# Patient Record
Sex: MALE | Race: OTHER | Employment: FULL TIME | URBAN - METROPOLITAN AREA
[De-identification: names, ages, dates, MRNs, and addresses within clinical notes are randomized per-mention and may not be internally consistent; named-entity substitution may affect disease eponyms.]

---

## 2020-08-14 ENCOUNTER — HOSPITAL ENCOUNTER (EMERGENCY)
Age: 40
Discharge: HOME | End: 2020-08-14
Attending: EMERGENCY MEDICINE

## 2020-08-14 VITALS
DIASTOLIC BLOOD PRESSURE: 82 MMHG | HEART RATE: 68 BPM | TEMPERATURE: 97.4 F | BODY MASS INDEX: 34.55 KG/M2 | HEIGHT: 66 IN | SYSTOLIC BLOOD PRESSURE: 131 MMHG | WEIGHT: 215 LBS | RESPIRATION RATE: 16 BRPM | OXYGEN SATURATION: 98 %

## 2020-08-14 DIAGNOSIS — L98.9 SKIN LESION: Primary | ICD-10-CM

## 2020-08-14 PROCEDURE — 99282 EMERGENCY DEPT VISIT SF MDM: CPT

## 2020-08-14 NOTE — ED NOTES
I have reviewed discharge instructions with the patient. The patient verbalized understanding. Patient left ED via Discharge Method: ambulatory to Home with himself. Opportunity for questions and clarification provided. Patient given 0 scripts. To continue your aftercare when you leave the hospital, you may receive an automated call from our care team to check in on how you are doing. This is a free service and part of our promise to provide the best care and service to meet your aftercare needs.  If you have questions, or wish to unsubscribe from this service please call [phone]. Thank you for Choosing our [facility] Emergency Department.

## 2020-08-14 NOTE — ED PROVIDER NOTES
Patient is a 27-year-old  male who comes to the emergency department today worried about a skin lesion on his left cheek. He states about a months ago it started as a small red spot but is increased in size and is now about 1/2 cm. He denies any pain. He denies any bleeding. He denies any pus drainage. He states he has an appointment next month scheduled with a dermatologist however someone scared him and told him he needed to come to the ER today. The history is provided by the patient. Skin Problem    This is a chronic problem. The current episode started more than 1 week ago. The problem has been gradually worsening. The problem is associated with nothing. There has been no fever. The rash is present on the face. The patient is experiencing no pain. He has tried nothing for the symptoms. History reviewed. No pertinent past medical history. History reviewed. No pertinent surgical history. History reviewed. No pertinent family history.     Social History     Socioeconomic History    Marital status: Not on file     Spouse name: Not on file    Number of children: Not on file    Years of education: Not on file    Highest education level: Not on file   Occupational History    Not on file   Social Needs    Financial resource strain: Not on file    Food insecurity     Worry: Not on file     Inability: Not on file    Transportation needs     Medical: Not on file     Non-medical: Not on file   Tobacco Use    Smoking status: Never Smoker    Smokeless tobacco: Never Used   Substance and Sexual Activity    Alcohol use: Not on file    Drug use: Not on file    Sexual activity: Not on file   Lifestyle    Physical activity     Days per week: Not on file     Minutes per session: Not on file    Stress: Not on file   Relationships    Social connections     Talks on phone: Not on file     Gets together: Not on file     Attends Religion service: Not on file     Active member of club or organization: Not on file     Attends meetings of clubs or organizations: Not on file     Relationship status: Not on file    Intimate partner violence     Fear of current or ex partner: Not on file     Emotionally abused: Not on file     Physically abused: Not on file     Forced sexual activity: Not on file   Other Topics Concern    Not on file   Social History Narrative    Not on file         ALLERGIES: Patient has no known allergies. Review of Systems   Constitutional: Negative for chills, fatigue and fever. HENT: Negative for congestion, rhinorrhea and sore throat. Eyes: Negative for pain, discharge and visual disturbance. Respiratory: Negative for cough and shortness of breath. Cardiovascular: Negative for chest pain and palpitations. Gastrointestinal: Negative for abdominal pain, diarrhea and nausea. Endocrine: Negative for polydipsia and polyuria. Genitourinary: Negative for dysuria, frequency and urgency. Musculoskeletal: Negative for back pain and neck pain. Skin: Positive for wound. Negative for rash. Neurological: Negative for seizures, syncope and weakness. Hematological: Negative. Vitals:    08/14/20 1920   BP: 131/82   Pulse: 68   Resp: 16   Temp: 97.4 °F (36.3 °C)   SpO2: 98%   Weight: 97.5 kg (215 lb)   Height: 5' 6\" (1.676 m)            Physical Exam  Vitals signs and nursing note reviewed. Constitutional:       Appearance: Normal appearance. He is well-developed. HENT:      Head: Normocephalic and atraumatic. Nose: Nose normal.   Eyes:      Extraocular Movements: Extraocular movements intact. Conjunctiva/sclera: Conjunctivae normal.      Pupils: Pupils are equal, round, and reactive to light. Neck:      Musculoskeletal: Normal range of motion and neck supple. Cardiovascular:      Rate and Rhythm: Normal rate and regular rhythm. Heart sounds: Normal heart sounds.    Pulmonary:      Effort: Pulmonary effort is normal.      Breath sounds: Normal breath sounds. Abdominal:      Palpations: Abdomen is soft. Tenderness: There is no abdominal tenderness. There is no guarding or rebound. Musculoskeletal: Normal range of motion. General: No tenderness. Lymphadenopathy:      Cervical: No cervical adenopathy. Skin:     General: Skin is warm and dry. Findings: No rash. Comments: 5 mm raised red nodule on the upper left cheek. No fluctuance. No bleeding or pus drainage. Neurological:      General: No focal deficit present. Mental Status: He is alert and oriented to person, place, and time. GCS: GCS eye subscore is 4. GCS verbal subscore is 5. GCS motor subscore is 6. Cranial Nerves: No cranial nerve deficit. Sensory: No sensory deficit. Motor: Motor function is intact. Psychiatric:         Mood and Affect: Mood is anxious. MDM  Number of Diagnoses or Management Options  Diagnosis management comments: I wore appropriate PPE throughout this patient's ED visit. Ivet Canada MD, 7:34 PM    Vital signs are normal.  Exam is benign except for the skin lesion on the cheek. It does not appear to be infected at this time. 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 He is advised to keep that appointment with the skin specialist for possible biopsy. Voice dictation software was used during the making of this note. This software is not perfect and grammatical and other typographical errors may be present. This note has been proofread, but may still contain errors.   Ivet Canada MD; 8/14/2020 @7:34 PM   ===================================================================      Risk of Complications, Morbidity, and/or Mortality  Presenting problems: low  Diagnostic procedures: minimal  Management options: minimal    Patient Progress  Patient progress: stable         Procedures

## 2020-08-14 NOTE — ED TRIAGE NOTES
Approx 1 cm raised red spot to top of left cheek for 8 months. Pt states that he tried to make an appointment for MD to check it out but \"it's a long time wait. \" Spot not painful. Pt masked at triage.